# Patient Record
Sex: FEMALE | Race: OTHER | URBAN - METROPOLITAN AREA
[De-identification: names, ages, dates, MRNs, and addresses within clinical notes are randomized per-mention and may not be internally consistent; named-entity substitution may affect disease eponyms.]

---

## 2023-04-03 ENCOUNTER — OFFICE VISIT (OUTPATIENT)
Dept: OBGYN CLINIC | Facility: CLINIC | Age: 23
End: 2023-04-03

## 2023-04-03 VITALS — WEIGHT: 166.38 LBS | HEART RATE: 80 BPM | SYSTOLIC BLOOD PRESSURE: 98 MMHG | DIASTOLIC BLOOD PRESSURE: 52 MMHG

## 2023-04-03 DIAGNOSIS — Z3A.14 14 WEEKS GESTATION OF PREGNANCY: ICD-10-CM

## 2023-04-03 DIAGNOSIS — N92.6 MISSED MENSES: Primary | ICD-10-CM

## 2023-04-03 LAB
CONTROL LINE PRESENT WITH A CLEAR BACKGROUND (YES/NO): YES YES/NO
KIT LOT #: NORMAL NUMERIC
PREGNANCY TEST, URINE: POSITIVE

## 2023-04-27 ENCOUNTER — NURSE ONLY (OUTPATIENT)
Dept: OBGYN CLINIC | Facility: CLINIC | Age: 23
End: 2023-04-27

## 2023-04-27 VITALS — HEIGHT: 61.42 IN | BODY MASS INDEX: 31 KG/M2

## 2023-04-27 DIAGNOSIS — Z34.02 ENCOUNTER FOR SUPERVISION OF NORMAL FIRST PREGNANCY IN SECOND TRIMESTER: Primary | ICD-10-CM

## 2023-04-27 RX ORDER — MELATONIN
400 DAILY
COMMUNITY

## 2023-04-27 RX ORDER — CHOLECALCIFEROL (VITAMIN D3) 25 MCG
1 TABLET,CHEWABLE ORAL DAILY
COMMUNITY

## 2023-04-27 NOTE — PROGRESS NOTES
Phone OB RN education visit completed, educational material reviewed. Pt verbalized understanding. Orders placed for NOB labs. Pt recently moved from [facility] 6 months ago. List of food pantries and WIC information provided to pt. Ride assist phone number given to pt. Pt has never had a pap smear. Pt desires NIPT. Pt will complete EPDS and LUZ-7 during next appt. Pt was scheduled for NOB appt. Call transferred to central scheduling so pt can schedule ultrasound. Pt desires natural birth. Pt. Has answered NO 5P questions and has NO  risk factors. Pt. Given What pregnant women need to know handout. Pt counseled on ACOG & ACNM guidelines recommending carrier testing. Pt declines testing.

## 2023-05-03 ENCOUNTER — TELEPHONE (OUTPATIENT)
Dept: OBGYN CLINIC | Facility: CLINIC | Age: 23
End: 2023-05-03